# Patient Record
Sex: MALE | Race: BLACK OR AFRICAN AMERICAN | Employment: FULL TIME | URBAN - METROPOLITAN AREA
[De-identification: names, ages, dates, MRNs, and addresses within clinical notes are randomized per-mention and may not be internally consistent; named-entity substitution may affect disease eponyms.]

---

## 2017-12-12 DIAGNOSIS — J30.9 ALLERGIC RHINITIS: ICD-10-CM

## 2017-12-12 RX ORDER — FLUTICASONE PROPIONATE 50 MCG
SPRAY, SUSPENSION (ML) NASAL
Qty: 1 BOTTLE | Refills: 3 | Status: SHIPPED | OUTPATIENT
Start: 2017-12-12 | End: 2019-01-08 | Stop reason: SDUPTHER

## 2019-01-08 ENCOUNTER — OFFICE VISIT (OUTPATIENT)
Dept: FAMILY MEDICINE CLINIC | Age: 29
End: 2019-01-08

## 2019-01-08 VITALS
OXYGEN SATURATION: 98 % | BODY MASS INDEX: 27.64 KG/M2 | WEIGHT: 197.4 LBS | TEMPERATURE: 98.5 F | HEART RATE: 83 BPM | RESPIRATION RATE: 14 BRPM | HEIGHT: 71 IN | DIASTOLIC BLOOD PRESSURE: 83 MMHG | SYSTOLIC BLOOD PRESSURE: 140 MMHG

## 2019-01-08 DIAGNOSIS — N48.89 PENILE IRRITATION: Primary | ICD-10-CM

## 2019-01-08 DIAGNOSIS — J30.9 ALLERGIC RHINITIS: ICD-10-CM

## 2019-01-08 RX ORDER — AZITHROMYCIN 250 MG/1
TABLET, FILM COATED ORAL
Qty: 6 TAB | Refills: 0 | Status: SHIPPED | OUTPATIENT
Start: 2019-01-08 | End: 2019-01-13

## 2019-01-08 RX ORDER — FLUTICASONE PROPIONATE 50 MCG
SPRAY, SUSPENSION (ML) NASAL
Qty: 1 BOTTLE | Refills: 3 | Status: SHIPPED | OUTPATIENT
Start: 2019-01-08

## 2019-01-08 NOTE — PROGRESS NOTES
Chief Complaint Patient presents with  Testicle Pain Pt stated that he is having discomfort in penis. Pt stated that it's tingling feeling. X 3 weeks Pt reports that he received oral sex from a woman about 3 weeks ago, developed a tingling feeling at the head of his penis. Pt reports that he went to [facility], was evaluated, all testing negative. Subjective: (As above and below) Chief Complaint Patient presents with  Testicle Pain Pt stated that he is having discomfort in penis. Pt stated that it's tingling feeling. X 3 weeks  
 
he is a 29y.o. year old male who presents for evaluation. Reviewed PmHx, RxHx, FmHx, SocHx, AllgHx and updated in chart. Review of Systems - negative except as listed above Objective:  
 
Vitals:  
 01/08/19 1451 BP: 140/83 Pulse: 83 Resp: 14 Temp: 98.5 °F (36.9 °C) TempSrc: Oral  
SpO2: 98% Weight: 197 lb 6.4 oz (89.5 kg) Height: 5' 11\" (1.803 m) Physical Examination: General appearance - alert, well appearing, and in no distress Mental status - normal mood, behavior, speech, dress, motor activity, and thought processes Mouth - mucous membranes moist, pharynx normal without lesions Chest - clear to auscultation, no wheezes, rales or rhonchi, symmetric air entry Heart - normal rate, regular rhythm, normal S1, S2, no murmurs, rubs, clicks or gallops Assessment/ Plan: 1. Penile irritation 
-take medication as written due to recent exposure 
- azithromycin (ZITHROMAX) 250 mg tablet; Take 2 tablets today, then take 1 tablet daily  Dispense: 6 Tab; Refill: 0 
 
2. Allergic rhinitis 
- fluticasone (FLONASE) 50 mcg/actuation nasal spray; USE 1 SPRAY IN EACH NOSTRIL AS NEEDED  Dispense: 1 Bottle; Refill: 3 Follow-up Disposition: As needed I have discussed the diagnosis with the patient and the intended plan as seen in the above orders.   The patient has received an after-visit summary and questions were answered concerning future plans. Medication Side Effects and Warnings were discussed with patient: yes Patient Labs were reviewed: yes Patient Past Records were reviewed:  yes Klaudia Cameron M.D.

## 2019-01-08 NOTE — PROGRESS NOTES
Familia Garcia is a 29 y.o. male Chief Complaint Patient presents with  Testicle Pain Pt stated that he is having discomfort in penis. Pt stated that it's tingling feeling. X 3 weeks [facility] on 12/18 for Testicle problem. They did urinalysis and everything came out as negative. 1. Have you been to the ER, urgent care clinic since your last visit? Hospitalized since your last visit? No 
 
2. Have you seen or consulted any other health care providers outside of the [address] [place] [address] since your last visit? Yes, [facility] on 12/18 for Testicle problem. They did urinalysis and everything came out as negative. Include any pap smears or colon screening.  No

## 2019-03-12 ENCOUNTER — OFFICE VISIT (OUTPATIENT)
Dept: FAMILY MEDICINE CLINIC | Age: 29
End: 2019-03-12

## 2019-03-12 VITALS
RESPIRATION RATE: 16 BRPM | SYSTOLIC BLOOD PRESSURE: 127 MMHG | BODY MASS INDEX: 28.14 KG/M2 | HEIGHT: 71 IN | DIASTOLIC BLOOD PRESSURE: 69 MMHG | OXYGEN SATURATION: 98 % | WEIGHT: 201 LBS | TEMPERATURE: 98.5 F | HEART RATE: 71 BPM

## 2019-03-12 DIAGNOSIS — J01.00 ACUTE NON-RECURRENT MAXILLARY SINUSITIS: Primary | ICD-10-CM

## 2019-03-12 RX ORDER — DOXYCYCLINE 100 MG/1
100 CAPSULE ORAL 2 TIMES DAILY
Qty: 20 CAP | Refills: 0 | Status: SHIPPED | OUTPATIENT
Start: 2019-03-12 | End: 2019-03-22

## 2019-03-12 RX ORDER — OMEPRAZOLE 40 MG/1
40 CAPSULE, DELAYED RELEASE ORAL DAILY
Qty: 30 CAP | Refills: 5 | Status: SHIPPED | OUTPATIENT
Start: 2019-03-12 | End: 2019-05-29 | Stop reason: ALTCHOICE

## 2019-03-12 NOTE — PROGRESS NOTES
Chief Complaint   Patient presents with    Fever     \"on/off x's 1 week\"    Nasal Congestion       Pt reports having flu like symptoms about a week ago after visiting the [facility] the week before symptoms started. He states \"I jumped into a river while in the [facility] and came back with a fever and flu-like symptoms\". Pt states he was unable to get in to be seen by Dr. Sarah Ponce at that time and self medicated with OC medications but is still running a fever \"off/on\". 1. Have you been to the ER, urgent care clinic since your last visit? Hospitalized since your last visit? No    2. Have you seen or consulted any other health care providers outside of the [address] [place] [address] since your last visit? Include any pap smears or colon screening.  No    Visit Vitals  /69 (BP 1 Location: Left arm, BP Patient Position: Sitting)   Pulse 71   Temp 98.5 °F (36.9 °C) (Oral)   Resp 16   Ht 5' 11\" (1.803 m)   Wt 201 lb (91.2 kg)   SpO2 98%   BMI 28.03 kg/m²

## 2019-03-12 NOTE — PROGRESS NOTES
Chief Complaint   Patient presents with    Fever     \"on/off x's 1 week\"    Nasal Congestion     Pt reports having flu like symptoms about a week ago after visiting the [facility] the week before symptoms started. He states \"I jumped into a river while in the [facility] and came back with a fever and flu-like symptoms\". Pt states he was unable to get in to be seen by Dr. Med Kilpatrick at that time and self medicated with OC medications but is still running a fever \"off/on\". Last fever was Saturday, had been coming and going. Subjective:   Kevyn Romero is a 29 y.o. male who complains of congestion, sore throat and generalized sinus pain for 7 days, gradually worsening since that time. He denies a history of shortness of breath and wheezing. Evaluation to date: seen previously and thought to have a viral URI. Treatment to date: OTC products. Patient does not smoke cigarettes. Relevant PMH: No pertinent additional PMH. Patient Active Problem List   Diagnosis Code    Anxiety F41.9    Allergic rhinitis J30.9     Current Outpatient Medications   Medication Sig Dispense Refill    doxycycline (MONODOX) 100 mg capsule Take 1 Cap by mouth two (2) times a day for 10 days. 20 Cap 0    fluticasone (FLONASE) 50 mcg/actuation nasal spray USE 1 SPRAY IN EACH NOSTRIL AS NEEDED 1 Bottle 3    busPIRone (BUSPAR) 7.5 mg tablet Take 1 Tab by mouth two (2) times a day. (Patient not taking: Reported on 3/12/2019) 60 Tab 2    clotrimazole-betamethasone (LOTRISONE) topical cream Apply thin layer to rectal area BID x 1-2 wks (Patient not taking: Reported on 1/8/2019) 15 g 0     Allergies   Allergen Reactions    Banana Itching     Mouth Itching    Lemon Itching     Minute Maid Lemonade. Mouth Itching        Review of Systems  Pertinent items are noted in HPI.     Objective:     Visit Vitals  /69 (BP 1 Location: Left arm, BP Patient Position: Sitting)   Pulse 71   Temp 98.5 °F (36.9 °C) (Oral)   Resp 16   Ht 5' 11\" (1.803 m)   Wt 201 lb (91.2 kg)   SpO2 98%   BMI 28.03 kg/m²     General:  alert, cooperative, no distress   Eyes: conjunctivae/corneas clear. PERRL, EOM's intact. Fundi benign   Ears: abnormal TM AD - erythematous, abnormal TM AS - erythematous, dull   Sinuses: tenderness over generalized maxillary   Mouth:  Lips, mucosa, and tongue normal. Teeth and gums normal   Neck: supple, symmetrical, trachea midline and no adenopathy. Heart: S1 and S2 normal, no murmurs noted. Lungs: clear to auscultation bilaterally        Assessment/Plan:   sinusitis  Suggested symptomatic OTC remedies. Antibiotics per orders. RTC prn. ICD-10-CM ICD-9-CM    1. Acute non-recurrent maxillary sinusitis J01.00 461.0 doxycycline (MONODOX) 100 mg capsule     Encounter Diagnoses   Name Primary?  Acute non-recurrent maxillary sinusitis Yes     Orders Placed This Encounter    doxycycline (MONODOX) 100 mg capsule   .

## 2019-05-29 ENCOUNTER — OFFICE VISIT (OUTPATIENT)
Dept: FAMILY MEDICINE CLINIC | Age: 29
End: 2019-05-29

## 2019-05-29 VITALS
TEMPERATURE: 98.4 F | SYSTOLIC BLOOD PRESSURE: 132 MMHG | DIASTOLIC BLOOD PRESSURE: 84 MMHG | RESPIRATION RATE: 18 BRPM | HEIGHT: 71 IN | OXYGEN SATURATION: 98 % | BODY MASS INDEX: 28 KG/M2 | HEART RATE: 75 BPM | WEIGHT: 200 LBS

## 2019-05-29 DIAGNOSIS — R53.83 FATIGUE, UNSPECIFIED TYPE: ICD-10-CM

## 2019-05-29 DIAGNOSIS — K21.9 GASTROESOPHAGEAL REFLUX DISEASE WITHOUT ESOPHAGITIS: Primary | ICD-10-CM

## 2019-05-29 RX ORDER — PANTOPRAZOLE SODIUM 40 MG/1
40 TABLET, DELAYED RELEASE ORAL DAILY
Qty: 30 TAB | Refills: 3 | Status: SHIPPED | OUTPATIENT
Start: 2019-05-29

## 2019-05-29 RX ORDER — HYDROGEN PEROXIDE 3 %
SOLUTION, NON-ORAL MISCELLANEOUS DAILY
COMMUNITY
End: 2019-05-29

## 2019-05-29 NOTE — PROGRESS NOTES
Patient states he has had chest discomfort x 3 weeks. He has a history of reflux. After eating, he feels pressure in his chest and throat. He is getting fatigued easier, because he can't get the deep breaths out. He has tried otc omeprazole and nexium. Not working well. 1. Have you been to the ER, urgent care clinic since your last visit? Hospitalized since your last visit? No    2. Have you seen or consulted any other health care providers outside of the [address] [place] [address] since your last visit? Include any pap smears or colon screening. No       Chief Complaint   Patient presents with    Chest Pain     ? reflux, ? cardiac    Fatigue     x 3 weeks     He is a 34 y.o. male who presents for evalution. Reviewed PmHx, RxHx, FmHx, SocHx, AllgHx and updated and dated in the chart. Patient Active Problem List    Diagnosis    Anxiety    Allergic rhinitis       Review of Systems - negative except as listed above in the HPI    Objective:     Vitals:    05/29/19 1016   BP: 132/84   Pulse: 75   Resp: 18   Temp: 98.4 °F (36.9 °C)   SpO2: 98%   Weight: 200 lb (90.7 kg)   Height: 5' 11\" (1.803 m)     Physical Examination: General appearance - alert, well appearing, and in no distress  Neck - supple, no significant adenopathy  Chest - clear to auscultation, no wheezes, rales or rhonchi, symmetric air entry  Heart - normal rate, regular rhythm, normal S1, S2, no murmurs, rubs, clicks or gallops  Abdomen - soft, nontender, nondistended, no masses or organomegaly  Extremities - peripheral pulses normal, no pedal edema, no clubbing or cyanosis    Assessment/ Plan:   Diagnoses and all orders for this visit:    1. Gastroesophageal reflux disease without esophagitis  -     REFERRAL TO GASTROENTEROLOGY  -     pantoprazole (PROTONIX) 40 mg tablet; Take 1 Tab by mouth daily.  -add rx  -needs a scope    2.  Fatigue, unspecified type  -     LIPID PANEL  -     METABOLIC PANEL, COMPREHENSIVE  -     CBC WITH AUTOMATED DIFF  -     TSH 3RD GENERATION       Follow-up and Dispositions    · Return if symptoms worsen or fail to improve. I have discussed the diagnosis with the patient and the intended plan as seen in the above orders. The patient understands and agrees with the plan. The patient has received an after-visit summary and questions were answered concerning future plans. Medication Side Effects and Warnings were discussed with patient  Patient Labs were reviewed and or requested:  Patient Past Records were reviewed and or requested    Toni Matos M.D. There are no Patient Instructions on file for this visit.

## 2019-05-30 LAB
ALBUMIN SERPL-MCNC: 4.7 G/DL (ref 3.5–5.5)
ALBUMIN/GLOB SERPL: 1.7 {RATIO} (ref 1.2–2.2)
ALP SERPL-CCNC: 48 IU/L (ref 39–117)
ALT SERPL-CCNC: 33 IU/L (ref 0–44)
AST SERPL-CCNC: 20 IU/L (ref 0–40)
BASOPHILS # BLD AUTO: 0 X10E3/UL (ref 0–0.2)
BASOPHILS NFR BLD AUTO: 0 %
BILIRUB SERPL-MCNC: 0.4 MG/DL (ref 0–1.2)
BUN SERPL-MCNC: 14 MG/DL (ref 6–20)
BUN/CREAT SERPL: 13 (ref 9–20)
CALCIUM SERPL-MCNC: 9.5 MG/DL (ref 8.7–10.2)
CHLORIDE SERPL-SCNC: 103 MMOL/L (ref 96–106)
CHOLEST SERPL-MCNC: 248 MG/DL (ref 100–199)
CO2 SERPL-SCNC: 25 MMOL/L (ref 20–29)
CREAT SERPL-MCNC: 1.06 MG/DL (ref 0.76–1.27)
EOSINOPHIL # BLD AUTO: 0.1 X10E3/UL (ref 0–0.4)
EOSINOPHIL NFR BLD AUTO: 2 %
ERYTHROCYTE [DISTWIDTH] IN BLOOD BY AUTOMATED COUNT: 14.1 % (ref 12.3–15.4)
GLOBULIN SER CALC-MCNC: 2.7 G/DL (ref 1.5–4.5)
GLUCOSE SERPL-MCNC: 84 MG/DL (ref 65–99)
HCT VFR BLD AUTO: 41.1 % (ref 37.5–51)
HDLC SERPL-MCNC: 67 MG/DL
HGB BLD-MCNC: 14 G/DL (ref 13–17.7)
IMM GRANULOCYTES # BLD AUTO: 0 X10E3/UL (ref 0–0.1)
IMM GRANULOCYTES NFR BLD AUTO: 1 %
INTERPRETATION, 910389: NORMAL
LDLC SERPL CALC-MCNC: 161 MG/DL (ref 0–99)
LYMPHOCYTES # BLD AUTO: 1.8 X10E3/UL (ref 0.7–3.1)
LYMPHOCYTES NFR BLD AUTO: 35 %
MCH RBC QN AUTO: 28.6 PG (ref 26.6–33)
MCHC RBC AUTO-ENTMCNC: 34.1 G/DL (ref 31.5–35.7)
MCV RBC AUTO: 84 FL (ref 79–97)
MONOCYTES # BLD AUTO: 0.3 X10E3/UL (ref 0.1–0.9)
MONOCYTES NFR BLD AUTO: 6 %
NEUTROPHILS # BLD AUTO: 2.9 X10E3/UL (ref 1.4–7)
NEUTROPHILS NFR BLD AUTO: 56 %
PLATELET # BLD AUTO: 212 X10E3/UL (ref 150–450)
POTASSIUM SERPL-SCNC: 4 MMOL/L (ref 3.5–5.2)
PROT SERPL-MCNC: 7.4 G/DL (ref 6–8.5)
RBC # BLD AUTO: 4.9 X10E6/UL (ref 4.14–5.8)
SODIUM SERPL-SCNC: 142 MMOL/L (ref 134–144)
TRIGL SERPL-MCNC: 101 MG/DL (ref 0–149)
TSH SERPL DL<=0.005 MIU/L-ACNC: 1.58 UIU/ML (ref 0.45–4.5)
VLDLC SERPL CALC-MCNC: 20 MG/DL (ref 5–40)
WBC # BLD AUTO: 5.2 X10E3/UL (ref 3.4–10.8)

## 2019-05-30 NOTE — PROGRESS NOTES
ID verified X 3- informed of cholesterol & LDL increased- make some diet changes- voiced understanding.

## 2023-07-19 ENCOUNTER — NURSE TRIAGE (OUTPATIENT)
Dept: OTHER | Facility: CLINIC | Age: 33
End: 2023-07-19

## 2023-07-19 NOTE — TELEPHONE ENCOUNTER
[place]    Received call from Brenda at [facility], [place], operated by [facility] with [employer]. Subjective: Caller states \"numbness in fingers and toes,bottoms of feet feel \"funny at times\"\"     Current Symptoms: mid foot pain bilateral feet. Numbness in left hand, the third finger, intermittent, been there for about a week. Numbness more in the left foot than right, intermittent. Shoes make all of it worse. Onset: 1 week ago; worsening    Associated Symptoms: reduced appetite    Pain Severity: 2-3/10; numbness, feels like the circulation is not proper, burning; intermittent    Temperature: n/a     What has been tried: red wood supplement, gerry men's vitamins, aleve    LMP: NA Pregnant: NA    Recommended disposition: See PCP within 3 Days    Care advice provided, patient verbalizes understanding; denies any other questions or concerns; instructed to call back for any new or worsening symptoms. Patient/Caller agrees with recommended disposition; writer provided warm transfer to [facility] at [facility], [place], operated by [facility] for appointment scheduling    Attention Provider: Thank you for allowing me to participate in the care of your patient. The patient was connected to triage in response to information provided to the ECC/PSC. Please do not respond through this encounter as the response is not directed to a shared pool.     Reason for Disposition   Numbness or tingling on both sides of body and is a new symptom lasting > 24 hours    Protocols used: Neurologic Deficit-ADULT-[place]